# Patient Record
Sex: FEMALE | Race: WHITE | NOT HISPANIC OR LATINO | URBAN - NONMETROPOLITAN AREA
[De-identification: names, ages, dates, MRNs, and addresses within clinical notes are randomized per-mention and may not be internally consistent; named-entity substitution may affect disease eponyms.]

---

## 2021-09-01 ENCOUNTER — HISTORICAL (OUTPATIENT)
Dept: ADMINISTRATIVE | Facility: HOSPITAL | Age: 56
End: 2021-09-01

## 2021-09-16 ENCOUNTER — HISTORICAL (OUTPATIENT)
Dept: ADMINISTRATIVE | Facility: HOSPITAL | Age: 56
End: 2021-09-16

## 2021-10-22 LAB
ALBUMIN SERPL-MCNC: 3.8 G/DL (ref 3.4–5)
ALBUMIN/GLOB SERPL: 1.4 {RATIO}
ALP SERPL-CCNC: 65 U/L (ref 50–144)
ALT SERPL-CCNC: 13 U/L (ref 1–45)
ANION GAP SERPL CALC-SCNC: 6 MMOL/L (ref 7–16)
AST SERPL-CCNC: 22 U/L (ref 14–36)
BASOPHILS # BLD AUTO: 0.02 10*3/UL (ref 0.01–0.08)
BASOPHILS NFR BLD AUTO: 0.5 % (ref 0.1–1.2)
BILIRUB SERPL-MCNC: 0.49 MG/DL (ref 0.1–1)
BUN SERPL-MCNC: 16 MG/DL (ref 7–20)
CALCIUM SERPL-MCNC: 9.3 MG/DL (ref 8.4–10.2)
CHLORIDE SERPL-SCNC: 104 MMOL/L (ref 94–110)
CHOLEST SERPL-MCNC: 274 MG/DL (ref 0–200)
CO2 SERPL-SCNC: 32 MMOL/L (ref 21–32)
CREAT SERPL-MCNC: 0.79 MG/DL (ref 0.52–1.04)
CREAT/UREA NIT SERPL: 20.3 (ref 12–20)
EOSINOPHIL # BLD AUTO: 0.14 10*3/UL (ref 0.04–0.36)
EOSINOPHIL NFR BLD AUTO: 3.2 % (ref 0.7–7)
ERYTHROCYTE [DISTWIDTH] IN BLOOD BY AUTOMATED COUNT: 12.8 % (ref 11–14.5)
EST. AVERAGE GLUCOSE BLD GHB EST-MCNC: 108 MG/DL (ref 70–115)
GLOBULIN SER-MCNC: 2.7 G/DL (ref 2–3.9)
GLUCOSE SERPL-MCNC: 95 MGM./DL (ref 70–115)
HBA1C MFR BLD: 5.6 % (ref 4–6)
HCT VFR BLD AUTO: 42 % (ref 36–48)
HDLC SERPL-MCNC: 52 MG/DL (ref 40–60)
HGB BLD-MCNC: 13.8 G/DL (ref 11.8–16)
IMM GRANULOCYTES # BLD AUTO: 0.01 10*3/UL (ref 0–0.03)
IMM GRANULOCYTES NFR BLD AUTO: 0.2 % (ref 0–0.5)
LDLC SERPL CALC-MCNC: 166.7 MG/DL (ref 30–100)
LYMPHOCYTES # BLD AUTO: 1.81 10*3/UL (ref 1.16–3.74)
LYMPHOCYTES NFR BLD AUTO: 41.3 % (ref 20–55)
MCH RBC QN AUTO: 28.3 PG (ref 27–34)
MCHC RBC AUTO-ENTMCNC: 32.9 G/DL (ref 31–37)
MCV RBC AUTO: 86.1 FL (ref 79–99)
MONOCYTES # BLD AUTO: 0.44 10*3/UL (ref 0.24–0.36)
MONOCYTES NFR BLD AUTO: 10 % (ref 4.7–12.5)
NEUTROPHILS # BLD AUTO: 1.96 10*3/UL (ref 1.56–6.13)
NEUTROPHILS NFR BLD AUTO: 44.8 % (ref 37–73)
PLATELET # BLD AUTO: 253 10*3/UL (ref 140–371)
PMV BLD AUTO: 10.2 FL (ref 9.4–12.4)
POTASSIUM SERPL-SCNC: 4.1 MMOL/L (ref 3.5–5.1)
PROT SERPL-MCNC: 6.5 G/DL (ref 6.3–8.2)
RBC # BLD AUTO: 4.88 10*6/UL (ref 4–5.1)
SODIUM SERPL-SCNC: 142 MMOL/L (ref 135–145)
TRIGL SERPL-MCNC: 123 MG/DL (ref 30–200)
TSH SERPL-ACNC: 0.33 UIU/ML (ref 0.36–3.74)
WBC # SPEC AUTO: 4.4 10*3/UL (ref 4–11.5)

## 2022-01-06 LAB
INFLUENZA A ANTIGEN, POC: NEGATIVE
INFLUENZA B ANTIGEN, POC: NEGATIVE

## 2022-02-03 LAB
DEPRECATED CALCIDIOL+CALCIFEROL SERPL-MC: 28.1 NG/ML (ref 30–100)
T4 FREE SERPL-MCNC: 1.15 NG/DL (ref 0.78–2.19)
TSH SERPL-ACNC: 1.61 M[IU]/L (ref 0.36–3.74)

## 2022-02-04 LAB — VIT B12 SERPL-MCNC: 415 PG/ML (ref 211–946)

## 2022-04-11 ENCOUNTER — HISTORICAL (OUTPATIENT)
Dept: ADMINISTRATIVE | Facility: HOSPITAL | Age: 57
End: 2022-04-11

## 2022-04-29 VITALS
BODY MASS INDEX: 35.76 KG/M2 | HEIGHT: 64 IN | SYSTOLIC BLOOD PRESSURE: 120 MMHG | WEIGHT: 209.44 LBS | OXYGEN SATURATION: 99 % | DIASTOLIC BLOOD PRESSURE: 70 MMHG

## 2022-04-29 LAB — DEPRECATED CALCIDIOL+CALCIFEROL SERPL-MC: 28.2 NG/ML (ref 30–100)

## 2022-05-09 ENCOUNTER — HISTORICAL (OUTPATIENT)
Dept: ADMINISTRATIVE | Facility: HOSPITAL | Age: 57
End: 2022-05-09

## 2022-09-22 ENCOUNTER — HISTORICAL (OUTPATIENT)
Dept: ADMINISTRATIVE | Facility: HOSPITAL | Age: 57
End: 2022-09-22

## 2022-09-23 LAB — CRC RECOMMENDATION EXT: NORMAL

## 2023-01-26 ENCOUNTER — OFFICE VISIT (OUTPATIENT)
Dept: FAMILY MEDICINE | Facility: CLINIC | Age: 58
End: 2023-01-26
Payer: MEDICAID

## 2023-01-26 VITALS
TEMPERATURE: 98 F | HEIGHT: 64 IN | SYSTOLIC BLOOD PRESSURE: 116 MMHG | WEIGHT: 214.31 LBS | BODY MASS INDEX: 36.59 KG/M2 | DIASTOLIC BLOOD PRESSURE: 82 MMHG | HEART RATE: 70 BPM | OXYGEN SATURATION: 98 %

## 2023-01-26 VITALS
TEMPERATURE: 99 F | BODY MASS INDEX: 36.96 KG/M2 | OXYGEN SATURATION: 96 % | HEART RATE: 74 BPM | WEIGHT: 216.5 LBS | HEIGHT: 64 IN | DIASTOLIC BLOOD PRESSURE: 80 MMHG | SYSTOLIC BLOOD PRESSURE: 122 MMHG

## 2023-01-26 DIAGNOSIS — E66.09 CLASS 2 OBESITY DUE TO EXCESS CALORIES WITH BODY MASS INDEX (BMI) OF 37.0 TO 37.9 IN ADULT, UNSPECIFIED WHETHER SERIOUS COMORBIDITY PRESENT: ICD-10-CM

## 2023-01-26 DIAGNOSIS — F33.1 MODERATE EPISODE OF RECURRENT MAJOR DEPRESSIVE DISORDER: Primary | ICD-10-CM

## 2023-01-26 DIAGNOSIS — F50.81 BINGE EATING DISORDER: ICD-10-CM

## 2023-01-26 DIAGNOSIS — F41.9 ANXIETY: ICD-10-CM

## 2023-01-26 PROBLEM — J30.9 ALLERGIC RHINITIS: Status: ACTIVE | Noted: 2023-01-26

## 2023-01-26 PROBLEM — E55.9 VITAMIN D DEFICIENCY: Status: ACTIVE | Noted: 2023-01-26

## 2023-01-26 PROBLEM — M17.12 OSTEOARTHRITIS OF LEFT KNEE: Status: ACTIVE | Noted: 2023-01-26

## 2023-01-26 PROBLEM — G25.81 RESTLESS LEGS SYNDROME: Status: ACTIVE | Noted: 2023-01-26

## 2023-01-26 PROBLEM — E66.9 OBESITY: Status: ACTIVE | Noted: 2023-01-26

## 2023-01-26 PROBLEM — Z98.84 STATUS POST BARIATRIC SURGERY: Status: ACTIVE | Noted: 2023-01-26

## 2023-01-26 PROBLEM — E88.819 INSULIN RESISTANCE: Status: ACTIVE | Noted: 2023-01-26

## 2023-01-26 PROBLEM — M54.30 SCIATICA: Status: ACTIVE | Noted: 2023-01-26

## 2023-01-26 PROBLEM — F33.9 RECURRENT MAJOR DEPRESSIVE DISORDER: Status: ACTIVE | Noted: 2023-01-26

## 2023-01-26 PROCEDURE — 99213 PR OFFICE/OUTPT VISIT, EST, LEVL III, 20-29 MIN: ICD-10-PCS | Mod: ,,, | Performed by: NURSE PRACTITIONER

## 2023-01-26 PROCEDURE — 99213 OFFICE O/P EST LOW 20 MIN: CPT | Mod: ,,, | Performed by: NURSE PRACTITIONER

## 2023-01-26 RX ORDER — FLUTICASONE PROPIONATE 50 MCG
SPRAY, SUSPENSION (ML) NASAL
COMMUNITY
Start: 2023-01-10 | End: 2023-04-25 | Stop reason: SDUPTHER

## 2023-01-26 RX ORDER — ROPINIROLE 0.25 MG/1
0.25 TABLET, FILM COATED ORAL 3 TIMES DAILY
COMMUNITY
Start: 2022-11-10 | End: 2023-03-30 | Stop reason: SDUPTHER

## 2023-01-26 RX ORDER — ASPIRIN 325 MG
50000 TABLET, DELAYED RELEASE (ENTERIC COATED) ORAL
COMMUNITY
Start: 2023-01-05 | End: 2023-03-30 | Stop reason: SDUPTHER

## 2023-01-26 RX ORDER — LIRAGLUTIDE 6 MG/ML
0.6 INJECTION SUBCUTANEOUS DAILY
COMMUNITY
Start: 2022-12-22 | End: 2023-03-30

## 2023-01-26 RX ORDER — LIRAGLUTIDE 6 MG/ML
3 INJECTION SUBCUTANEOUS DAILY
Qty: 15 ML | Refills: 11 | Status: SHIPPED | OUTPATIENT
Start: 2023-01-26 | End: 2023-02-25

## 2023-01-26 RX ORDER — VENLAFAXINE HYDROCHLORIDE 75 MG/1
75 CAPSULE, EXTENDED RELEASE ORAL
COMMUNITY
Start: 2023-01-19 | End: 2023-01-26 | Stop reason: SDUPTHER

## 2023-01-26 RX ORDER — CETIRIZINE HYDROCHLORIDE 10 MG/1
10 TABLET ORAL DAILY
COMMUNITY
Start: 2022-12-14 | End: 2023-09-05 | Stop reason: SDUPTHER

## 2023-01-26 RX ORDER — MELOXICAM 15 MG/1
TABLET ORAL
COMMUNITY
Start: 2022-11-07 | End: 2023-03-30 | Stop reason: SDUPTHER

## 2023-01-26 RX ORDER — ROSUVASTATIN CALCIUM 10 MG/1
10 TABLET, COATED ORAL NIGHTLY
COMMUNITY
Start: 2023-01-19 | End: 2023-03-30 | Stop reason: SDUPTHER

## 2023-01-26 RX ORDER — VENLAFAXINE HYDROCHLORIDE 75 MG/1
75 CAPSULE, EXTENDED RELEASE ORAL DAILY
Qty: 90 CAPSULE | Refills: 3 | Status: SHIPPED | OUTPATIENT
Start: 2023-01-26 | End: 2023-03-30 | Stop reason: SDUPTHER

## 2023-01-26 RX ORDER — BUPROPION HYDROCHLORIDE 150 MG/1
150 TABLET ORAL DAILY
Qty: 90 TABLET | Refills: 3 | Status: SHIPPED | OUTPATIENT
Start: 2023-01-26 | End: 2023-03-30 | Stop reason: SDUPTHER

## 2023-01-26 NOTE — PROGRESS NOTES
Patient ID: Joseph Atwood is a 57 y.o. female.    Chief Complaint: Depression, Weight Check, and Follow-up                     History of Present Illness:  The patient is 57 y.o. White female for evaluation and management with a chief complaint of Depression, Weight Check, and Follow-up.  At last visit, venlafaxine increase to 75 mg daily and Wellbutrin decreased to 150 mg daily.  She reports feeling very well today.  She is no longer feeling as drowsy or fatigued.  Her cravings have resolved.  She now has more energy and seems to be her normal self.  Sleeping at least 8-10 hours per night and wakes feeling rested.  No SI/HI.    Tolerating Victoza 1.8 mg daily without side effect.    ROS: See HPI    Physical Exam  Vitals reviewed.   Constitutional:       Appearance: Normal appearance. She is obese.   Cardiovascular:      Rate and Rhythm: Normal rate and regular rhythm.      Heart sounds: Normal heart sounds.   Pulmonary:      Effort: Pulmonary effort is normal.      Breath sounds: Normal breath sounds.   Abdominal:      General: Bowel sounds are normal.      Palpations: Abdomen is soft.   Skin:     General: Skin is warm and dry.   Neurological:      General: No focal deficit present.      Mental Status: She is alert and oriented to person, place, and time. Mental status is at baseline.   Psychiatric:         Mood and Affect: Mood normal.         Behavior: Behavior normal.         Thought Content: Thought content normal.         Judgment: Judgment normal.         Vitals:    01/26/23 1425   BP: 116/82   Pulse: 70   Temp: 97.9 °F (36.6 °C)            Assessment/Plan:    1. Moderate episode of recurrent major depressive disorder  Continue venlafaxine 75 mg daily and bupropion 150 mg daily  Educated patient on the risks of serotonin based medications such as serotonin modulators and SSRIs/SNRIs including common side effects of nausea, GI upset, headache dizziness as well as the rare risk for worsening symptoms of  depression including development of suicidal thoughts or ideations, and serotonin syndrome.   Discussed benefits of medication not becoming noticeable until up to 6 weeks from start date.   Exercise daily. Get sunlight daily.  Practice positive phrases and repeat throughout the day, along with yoga and relaxation techniques.  Establish good social support, make changes to reduce stress.  Reports any symptoms of suicidal/homicidal ideations or self harm immediately. If clinic is closed, go to nearest emergency room.       2. Anxiety  Practice deep breathing when anxiety occurs.  Exercise daily. Get sunlight daily.  Avoid caffeine, alcohol and stimulants.  Practice positive phrases and repeat throughout the day, along with yoga and relaxation techniques.  Set healthy boundaries, avoid people and conversations that increase stress.  Educated patient on the risks of serotonin based medications such as serotonin modulators and SSRIs/SNRIs including common side effects of nausea, GI upset, headache dizziness as well as the rare risk for worsening symptoms of depression including development of suicidal thoughts or ideations, and serotonin syndrome.   Discussed benefits of medication not becoming noticeable until up to 6 weeks from start date.   Reports any suicidal or homicidal ideations immediately. If clinic is closed, go to nearest emergency room.      3. Binge eating disorder  No issues at this time  Continue counseling     4. Class 2 obesity due to excess calories with body mass index (BMI) of 37.0 to 37.9 in adult, unspecified whether serious comorbidity present  Continue Victoza but increase by 0.6 mg weekly to a goal of 3 mg daily   Body mass index is 37.04 kg/m².  Goal BMI <30.  Exercise 5 times a week for 30 minutes per day.  Avoid soda, simple sugars, excessive rice, potatoes or bread. Limit fast foods and fried foods.  Choose complex carbs in moderation (example: green vegetables, beans, oatmeal). Eat plenty  of fresh fruits and vegetables with lean meats daily.  Do not skip meals. Eat a balanced portion size.  Avoid fad diets. Consider permanent healthy life style changes.         No orders of the defined types were placed in this encounter.     Medications Ordered This Encounter   Medications    buPROPion (WELLBUTRIN XL) 150 MG TB24 tablet     Sig: Take 1 tablet (150 mg total) by mouth once daily.     Dispense:  90 tablet     Refill:  3    liraglutide 0.6 mg/0.1 mL, 18 mg/3 mL, subq PNIJ (VICTOZA 2-KATHRYN) 0.6 mg/0.1 mL (18 mg/3 mL) PnIj pen     Sig: Inject 3 mg into the skin once daily.     Dispense:  15 mL     Refill:  11    venlafaxine (EFFEXOR-XR) 75 MG 24 hr capsule     Sig: Take 1 capsule (75 mg total) by mouth once daily.     Dispense:  90 capsule     Refill:  3        Follow up in about 2 months (around 3/26/2023) for anxiety, obesity. or sooner as needed.      FLORENCIO Redman

## 2023-02-06 ENCOUNTER — DOCUMENTATION ONLY (OUTPATIENT)
Dept: ADMINISTRATIVE | Facility: HOSPITAL | Age: 58
End: 2023-02-06
Payer: MEDICAID

## 2023-03-30 ENCOUNTER — TELEPHONE (OUTPATIENT)
Dept: FAMILY MEDICINE | Facility: CLINIC | Age: 58
End: 2023-03-30

## 2023-03-30 ENCOUNTER — OFFICE VISIT (OUTPATIENT)
Dept: FAMILY MEDICINE | Facility: CLINIC | Age: 58
End: 2023-03-30
Payer: MEDICAID

## 2023-03-30 VITALS
HEART RATE: 88 BPM | SYSTOLIC BLOOD PRESSURE: 118 MMHG | DIASTOLIC BLOOD PRESSURE: 80 MMHG | TEMPERATURE: 97 F | HEIGHT: 64 IN | WEIGHT: 213 LBS | BODY MASS INDEX: 36.37 KG/M2 | OXYGEN SATURATION: 97 %

## 2023-03-30 DIAGNOSIS — E88.810 METABOLIC SYNDROME: ICD-10-CM

## 2023-03-30 DIAGNOSIS — G25.81 RESTLESS LEGS SYNDROME: ICD-10-CM

## 2023-03-30 DIAGNOSIS — L03.011 PARONYCHIA OF FINGER OF RIGHT HAND: ICD-10-CM

## 2023-03-30 DIAGNOSIS — F50.81 BINGE EATING DISORDER: ICD-10-CM

## 2023-03-30 DIAGNOSIS — E78.5 HYPERLIPIDEMIA, UNSPECIFIED HYPERLIPIDEMIA TYPE: ICD-10-CM

## 2023-03-30 DIAGNOSIS — E88.819 INSULIN RESISTANCE: Primary | ICD-10-CM

## 2023-03-30 DIAGNOSIS — F33.1 MODERATE EPISODE OF RECURRENT MAJOR DEPRESSIVE DISORDER: ICD-10-CM

## 2023-03-30 DIAGNOSIS — M17.12 PRIMARY OSTEOARTHRITIS OF LEFT KNEE: ICD-10-CM

## 2023-03-30 DIAGNOSIS — F41.9 ANXIETY: ICD-10-CM

## 2023-03-30 DIAGNOSIS — E55.9 VITAMIN D DEFICIENCY: ICD-10-CM

## 2023-03-30 PROCEDURE — 3074F PR MOST RECENT SYSTOLIC BLOOD PRESSURE < 130 MM HG: ICD-10-PCS | Mod: CPTII,,, | Performed by: NURSE PRACTITIONER

## 2023-03-30 PROCEDURE — 1159F MED LIST DOCD IN RCRD: CPT | Mod: CPTII,,, | Performed by: NURSE PRACTITIONER

## 2023-03-30 PROCEDURE — 3008F BODY MASS INDEX DOCD: CPT | Mod: CPTII,,, | Performed by: NURSE PRACTITIONER

## 2023-03-30 PROCEDURE — 3079F DIAST BP 80-89 MM HG: CPT | Mod: CPTII,,, | Performed by: NURSE PRACTITIONER

## 2023-03-30 PROCEDURE — 1160F PR REVIEW ALL MEDS BY PRESCRIBER/CLIN PHARMACIST DOCUMENTED: ICD-10-PCS | Mod: CPTII,,, | Performed by: NURSE PRACTITIONER

## 2023-03-30 PROCEDURE — 1159F PR MEDICATION LIST DOCUMENTED IN MEDICAL RECORD: ICD-10-PCS | Mod: CPTII,,, | Performed by: NURSE PRACTITIONER

## 2023-03-30 PROCEDURE — 3074F SYST BP LT 130 MM HG: CPT | Mod: CPTII,,, | Performed by: NURSE PRACTITIONER

## 2023-03-30 PROCEDURE — 3008F PR BODY MASS INDEX (BMI) DOCUMENTED: ICD-10-PCS | Mod: CPTII,,, | Performed by: NURSE PRACTITIONER

## 2023-03-30 PROCEDURE — 99214 PR OFFICE/OUTPT VISIT, EST, LEVL IV, 30-39 MIN: ICD-10-PCS | Mod: ,,, | Performed by: NURSE PRACTITIONER

## 2023-03-30 PROCEDURE — 99214 OFFICE O/P EST MOD 30 MIN: CPT | Mod: ,,, | Performed by: NURSE PRACTITIONER

## 2023-03-30 PROCEDURE — 3079F PR MOST RECENT DIASTOLIC BLOOD PRESSURE 80-89 MM HG: ICD-10-PCS | Mod: CPTII,,, | Performed by: NURSE PRACTITIONER

## 2023-03-30 PROCEDURE — 1160F RVW MEDS BY RX/DR IN RCRD: CPT | Mod: CPTII,,, | Performed by: NURSE PRACTITIONER

## 2023-03-30 RX ORDER — ASPIRIN 325 MG
50000 TABLET, DELAYED RELEASE (ENTERIC COATED) ORAL
Qty: 12 CAPSULE | Refills: 3 | Status: SHIPPED | OUTPATIENT
Start: 2023-03-30 | End: 2023-06-28

## 2023-03-30 RX ORDER — BUPROPION HYDROCHLORIDE 150 MG/1
150 TABLET ORAL DAILY
Qty: 90 TABLET | Refills: 3 | Status: SHIPPED | OUTPATIENT
Start: 2023-03-30 | End: 2023-10-10 | Stop reason: SDUPTHER

## 2023-03-30 RX ORDER — SEMAGLUTIDE 1.34 MG/ML
0.5 INJECTION, SOLUTION SUBCUTANEOUS
Qty: 2 EACH | Refills: 0 | Status: SHIPPED | OUTPATIENT
Start: 2023-03-30 | End: 2023-04-25 | Stop reason: DRUGHIGH

## 2023-03-30 RX ORDER — MELOXICAM 15 MG/1
15 TABLET ORAL DAILY PRN
Qty: 90 TABLET | Refills: 3 | Status: SHIPPED | OUTPATIENT
Start: 2023-03-30 | End: 2023-06-28

## 2023-03-30 RX ORDER — ROSUVASTATIN CALCIUM 10 MG/1
10 TABLET, COATED ORAL NIGHTLY
Qty: 90 TABLET | Refills: 3 | Status: SHIPPED | OUTPATIENT
Start: 2023-03-30 | End: 2023-08-24

## 2023-03-30 RX ORDER — ROPINIROLE 0.25 MG/1
0.25 TABLET, FILM COATED ORAL NIGHTLY
Qty: 90 TABLET | Refills: 3 | Status: SHIPPED | OUTPATIENT
Start: 2023-03-30 | End: 2024-09-05

## 2023-03-30 RX ORDER — VENLAFAXINE HYDROCHLORIDE 75 MG/1
75 CAPSULE, EXTENDED RELEASE ORAL DAILY
Qty: 90 CAPSULE | Refills: 3 | Status: SHIPPED | OUTPATIENT
Start: 2023-03-30 | End: 2023-10-10 | Stop reason: SDUPTHER

## 2023-03-30 NOTE — PROGRESS NOTES
Patient ID: Joseph Atwood is a 57 y.o. female.    Chief Complaint: Obesity and Follow-up                     History of Present Illness:  The patient is 57 y.o. White female for evaluation and management with a chief complaint of Obesity and Follow-up .  Takes Victoza daily but continues to struggle with her diet.  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 She is also in the process of moving to [place].  No longer binge eating and finds anxiety and depression to be much improved.  Resting well. She only requires as-needed dose of Requip for restless leg.    She complains of pain and redness near the fingernail of the 3rd digit of her right hand.  Has been present for about 1 week.  She continues to have trauma to the area while sorting through folders in various documents.  No drainage from the area.    ROS: See HPI    Past Medical History:  has a past medical history of Allergy, Anxiety, Binge eating disorder, Hair loss, Insulin resistance, Major depression, recurrent, chronic, Obesity, unspecified, Osteoarthritis of left knee, RLS (restless legs syndrome), Sciatica of left side without back pain, and Vitamin D deficiency.    Current Outpatient Medications   Medication Instructions    buPROPion (WELLBUTRIN XL) 150 mg, Oral, Daily    cetirizine (ZYRTEC) 10 mg, Oral, Daily    cholecalciferol (vitamin D3) 50,000 Units, Oral, Every 7 days    fluticasone propionate (FLONASE) 50 mcg/actuation nasal spray   See Instructions, USE 2 SPRAYS IN EACH NOSTRIL DAILY, # 16 mL, 0 Refill(s), Pharmacy: [id number], 162, cm, Height/Length Dosing, 12/13/22 14:34:00 CST, 98.2, kg, Weight Dosing, 12/13/22 14:34:00 CST    meloxicam (MOBIC) 15 mg, Oral, Daily PRN    OZEMPIC 0.5 mg, Subcutaneous, Every 7 days    rOPINIRole (REQUIP) 0.25 mg, Oral, Nightly    rosuvastatin (CRESTOR) 10 mg, Oral, Nightly    venlafaxine (EFFEXOR-XR) 75 mg, Oral, Daily       Patient is allergic to hydrocodone-acetaminophen and  "penicillin.       Visit Vitals  /80   Pulse 88   Temp 97.2 °F (36.2 °C) (Temporal)   Ht 5' 3.78" (1.62 m)   Wt 96.6 kg (213 lb)   SpO2 97%   BMI 36.81 kg/m²         Physical Exam  Vitals reviewed.   Constitutional:       Appearance: Normal appearance. She is obese.   Cardiovascular:      Rate and Rhythm: Normal rate and regular rhythm.      Heart sounds: Normal heart sounds.   Pulmonary:      Effort: Pulmonary effort is normal.      Breath sounds: Normal breath sounds.   Abdominal:      General: Bowel sounds are normal.      Palpations: Abdomen is soft.   Skin:     General: Skin is warm and dry.      Comments: Area of erythema/edema surrounding nail 3rd digit of right hand   Neurological:      General: No focal deficit present.      Mental Status: She is alert. Mental status is at baseline.   Psychiatric:         Mood and Affect: Mood normal.         Behavior: Behavior normal.         Thought Content: Thought content normal.         Judgment: Judgment normal.         Assessment/Plan:    ICD-10-CM ICD-9-CM   1. Insulin resistance  E88.81 277.7   2. Metabolic syndrome  E88.81 277.7   3. Binge eating disorder  F50.81 307.50   4. Moderate episode of recurrent major depressive disorder  F33.1 296.32   5. Anxiety  F41.9 300.00   6. Restless legs syndrome  G25.81 333.94   7. Hyperlipidemia, unspecified hyperlipidemia type  E78.5 272.4   8. Primary osteoarthritis of left knee  M17.12 715.16       1. Metabolic syndrome  - semaglutide (OZEMPIC) 0.25 mg or 0.5 mg(2 mg/1.5 mL) pen injector; Inject 0.5 mg into the skin every 7 days.  Dispense: 2 each; Refill: 0    2. Insulin resistance  - semaglutide (OZEMPIC) 0.25 mg or 0.5 mg(2 mg/1.5 mL) pen injector; Inject 0.5 mg into the skin every 7 days.  Dispense: 2 each; Refill: 0    3. Binge eating disorder      4. Moderate episode of recurrent major depressive disorder  - buPROPion (WELLBUTRIN XL) 150 MG TB24 tablet; Take 1 tablet (150 mg total) by mouth once daily.  Dispense: " 90 tablet; Refill: 3  - venlafaxine (EFFEXOR-XR) 75 MG 24 hr capsule; Take 1 capsule (75 mg total) by mouth once daily.  Dispense: 90 capsule; Refill: 3    5. Anxiety  - buPROPion (WELLBUTRIN XL) 150 MG TB24 tablet; Take 1 tablet (150 mg total) by mouth once daily.  Dispense: 90 tablet; Refill: 3  - venlafaxine (EFFEXOR-XR) 75 MG 24 hr capsule; Take 1 capsule (75 mg total) by mouth once daily.  Dispense: 90 capsule; Refill: 3    6. Restless legs syndrome  - rOPINIRole (REQUIP) 0.25 MG tablet; Take 1 tablet (0.25 mg total) by mouth every evening.  Dispense: 90 tablet; Refill: 3    7. Hyperlipidemia, unspecified hyperlipidemia type  - rosuvastatin (CRESTOR) 10 MG tablet; Take 1 tablet (10 mg total) by mouth every evening.  Dispense: 90 tablet; Refill: 3    8. Primary osteoarthritis of left knee  - meloxicam (MOBIC) 15 MG tablet; Take 1 tablet (15 mg total) by mouth daily as needed for Pain.  Dispense: 90 tablet; Refill: 3     Stop Victoza. Initiate Ozempic 0.5 mg weekly and plan to increase to 1 mg weekly if tolerating well over the next few weeks   Continue all other current medications as prescribed, refills given  Counseled on diet and exercise  FLP and CMP; will call with results   Warm Epson salt soaks, cleansed thoroughly with soap and water, apply Neosporin to 3rd digit of right hand.  If area of infection worsens, treat with Bactrim DS for 1 week       Follow up in about 3 months (around 6/30/2023) for With Fasting Labs Prior. or sooner as needed.    Future Appointments   Date Time Provider Department Center   6/20/2023  1:00 PM FLORENCIO Redman FNP

## 2023-04-03 PROCEDURE — 80061 LIPID PANEL: CPT | Performed by: NURSE PRACTITIONER

## 2023-04-03 PROCEDURE — 80053 COMPREHEN METABOLIC PANEL: CPT | Performed by: NURSE PRACTITIONER

## 2023-04-06 ENCOUNTER — TELEPHONE (OUTPATIENT)
Dept: FAMILY MEDICINE | Facility: CLINIC | Age: 58
End: 2023-04-06
Payer: MEDICAID

## 2023-04-06 NOTE — TELEPHONE ENCOUNTER
----- Message from Lakisha Flores MA sent at 4/6/2023 10:04 AM CDT -----  Regarding: Call back/Stliudmila  Needs antibiotic eye drops called in for a stye under her eye lid. Said she called yesterday and left a message. Please call her back.    [facility] in [place]  [phone]

## 2023-04-06 NOTE — TELEPHONE ENCOUNTER
I spoke to pt, advised her to apply warm compresses and keep her hands and fingernails clean, buy Stye away, let it run it's course. Pt verbalized understanding

## 2023-04-25 ENCOUNTER — TELEPHONE (OUTPATIENT)
Dept: FAMILY MEDICINE | Facility: CLINIC | Age: 58
End: 2023-04-25
Payer: MEDICAID

## 2023-04-25 DIAGNOSIS — J30.9 ALLERGIC RHINITIS, UNSPECIFIED SEASONALITY, UNSPECIFIED TRIGGER: Primary | ICD-10-CM

## 2023-04-25 DIAGNOSIS — E88.819 INSULIN RESISTANCE: ICD-10-CM

## 2023-04-25 DIAGNOSIS — E88.810 METABOLIC SYNDROME: ICD-10-CM

## 2023-04-25 RX ORDER — SEMAGLUTIDE 1.34 MG/ML
1 INJECTION, SOLUTION SUBCUTANEOUS
Qty: 3 ML | Refills: 11 | Status: SHIPPED | OUTPATIENT
Start: 2023-04-25 | End: 2023-09-05

## 2023-04-25 RX ORDER — FLUTICASONE PROPIONATE 50 MCG
SPRAY, SUSPENSION (ML) NASAL
Qty: 16 G | Refills: 11 | Status: SHIPPED | OUTPATIENT
Start: 2023-04-25

## 2023-04-25 NOTE — TELEPHONE ENCOUNTER
----- Message from Patricia Reddy sent at 4/25/2023  1:29 PM CDT -----  Regarding: Med question  Patient had her prescriptions refilled at her last appt. And then she had them transferred from [facility] to [facility]. Her Nasal [place] was not sent to [facility], and [facility] needs to know if there is any refills on the nasal spray, and if it could be sent to [facility].  They are unable to get in touch with [facility] to question them about it..      She is also wanting to talk to Ricarda about the injections - to check on the dosage.       Joseph Garcia   [phone]

## 2023-04-25 NOTE — TELEPHONE ENCOUNTER
Pt states the Ozempic 0.5 mg is working well for her and feels it can be increased at this time, per lov note pt can increase to 1 mg weekly. Ozempic and  Fluticasone nasal spray were sent to [facility].

## 2023-08-17 ENCOUNTER — TELEPHONE (OUTPATIENT)
Dept: FAMILY MEDICINE | Facility: CLINIC | Age: 58
End: 2023-08-17
Payer: MEDICAID

## 2023-08-17 NOTE — TELEPHONE ENCOUNTER
Pt weighted in at 190 today wanted you to know. She wants to take ozempic but insurance won't pay so she said she will go back to taking Victoza. She has an appt. With you 9-5-23 @ 2:30

## 2023-08-21 NOTE — TELEPHONE ENCOUNTER
Please let her know that we have experienced availability issues with Victoza at the hospital.  We can revisit this topic at her upcoming appointment but for now I am only sending the medication for diabetics.

## 2023-08-24 DIAGNOSIS — E78.5 HYPERLIPIDEMIA, UNSPECIFIED HYPERLIPIDEMIA TYPE: ICD-10-CM

## 2023-08-24 RX ORDER — ROSUVASTATIN CALCIUM 10 MG/1
10 TABLET, COATED ORAL NIGHTLY
Qty: 90 TABLET | Refills: 3 | Status: SHIPPED | OUTPATIENT
Start: 2023-08-24

## 2023-09-05 ENCOUNTER — OFFICE VISIT (OUTPATIENT)
Dept: FAMILY MEDICINE | Facility: CLINIC | Age: 58
End: 2023-09-05
Payer: MEDICAID

## 2023-09-05 VITALS
DIASTOLIC BLOOD PRESSURE: 70 MMHG | TEMPERATURE: 99 F | OXYGEN SATURATION: 97 % | HEART RATE: 72 BPM | SYSTOLIC BLOOD PRESSURE: 110 MMHG | WEIGHT: 197.38 LBS | BODY MASS INDEX: 34.97 KG/M2 | HEIGHT: 63 IN

## 2023-09-05 DIAGNOSIS — L30.4 INTERTRIGO: ICD-10-CM

## 2023-09-05 DIAGNOSIS — E55.9 VITAMIN D DEFICIENCY: ICD-10-CM

## 2023-09-05 DIAGNOSIS — E78.5 HYPERLIPIDEMIA, UNSPECIFIED HYPERLIPIDEMIA TYPE: ICD-10-CM

## 2023-09-05 DIAGNOSIS — F41.9 ANXIETY: ICD-10-CM

## 2023-09-05 DIAGNOSIS — J30.9 ALLERGIC RHINITIS, UNSPECIFIED SEASONALITY, UNSPECIFIED TRIGGER: Primary | ICD-10-CM

## 2023-09-05 DIAGNOSIS — G62.9 NEUROPATHY: ICD-10-CM

## 2023-09-05 DIAGNOSIS — F33.1 MODERATE EPISODE OF RECURRENT MAJOR DEPRESSIVE DISORDER: ICD-10-CM

## 2023-09-05 DIAGNOSIS — Z12.4 CERVICAL CANCER SCREENING: ICD-10-CM

## 2023-09-05 DIAGNOSIS — Z98.84 STATUS POST BARIATRIC SURGERY: ICD-10-CM

## 2023-09-05 DIAGNOSIS — Z12.31 SCREENING MAMMOGRAM FOR BREAST CANCER: ICD-10-CM

## 2023-09-05 DIAGNOSIS — G25.81 RESTLESS LEGS SYNDROME: ICD-10-CM

## 2023-09-05 PROCEDURE — 3074F SYST BP LT 130 MM HG: CPT | Mod: CPTII,,, | Performed by: NURSE PRACTITIONER

## 2023-09-05 PROCEDURE — 1160F PR REVIEW ALL MEDS BY PRESCRIBER/CLIN PHARMACIST DOCUMENTED: ICD-10-PCS | Mod: CPTII,,, | Performed by: NURSE PRACTITIONER

## 2023-09-05 PROCEDURE — 3074F PR MOST RECENT SYSTOLIC BLOOD PRESSURE < 130 MM HG: ICD-10-PCS | Mod: CPTII,,, | Performed by: NURSE PRACTITIONER

## 2023-09-05 PROCEDURE — 1159F PR MEDICATION LIST DOCUMENTED IN MEDICAL RECORD: ICD-10-PCS | Mod: CPTII,,, | Performed by: NURSE PRACTITIONER

## 2023-09-05 PROCEDURE — 99214 OFFICE O/P EST MOD 30 MIN: CPT | Mod: ,,, | Performed by: NURSE PRACTITIONER

## 2023-09-05 PROCEDURE — 3008F BODY MASS INDEX DOCD: CPT | Mod: CPTII,,, | Performed by: NURSE PRACTITIONER

## 2023-09-05 PROCEDURE — 3078F DIAST BP <80 MM HG: CPT | Mod: CPTII,,, | Performed by: NURSE PRACTITIONER

## 2023-09-05 PROCEDURE — 3008F PR BODY MASS INDEX (BMI) DOCUMENTED: ICD-10-PCS | Mod: CPTII,,, | Performed by: NURSE PRACTITIONER

## 2023-09-05 PROCEDURE — 3078F PR MOST RECENT DIASTOLIC BLOOD PRESSURE < 80 MM HG: ICD-10-PCS | Mod: CPTII,,, | Performed by: NURSE PRACTITIONER

## 2023-09-05 PROCEDURE — 1159F MED LIST DOCD IN RCRD: CPT | Mod: CPTII,,, | Performed by: NURSE PRACTITIONER

## 2023-09-05 PROCEDURE — 1160F RVW MEDS BY RX/DR IN RCRD: CPT | Mod: CPTII,,, | Performed by: NURSE PRACTITIONER

## 2023-09-05 PROCEDURE — 99214 PR OFFICE/OUTPT VISIT, EST, LEVL IV, 30-39 MIN: ICD-10-PCS | Mod: ,,, | Performed by: NURSE PRACTITIONER

## 2023-09-05 RX ORDER — KETOCONAZOLE 20 MG/G
CREAM TOPICAL DAILY
Qty: 60 G | Refills: 0 | Status: SHIPPED | OUTPATIENT
Start: 2023-09-05 | End: 2023-10-10

## 2023-09-05 RX ORDER — CETIRIZINE HYDROCHLORIDE 10 MG/1
10 TABLET ORAL DAILY
Qty: 90 TABLET | Refills: 3 | Status: SHIPPED | OUTPATIENT
Start: 2023-09-05

## 2023-09-05 RX ORDER — ASPIRIN 325 MG
50000 TABLET, DELAYED RELEASE (ENTERIC COATED) ORAL
COMMUNITY
Start: 2023-08-25

## 2023-09-05 RX ORDER — MELOXICAM 15 MG/1
15 TABLET ORAL
COMMUNITY
Start: 2023-08-24 | End: 2023-10-10

## 2023-09-05 NOTE — PROGRESS NOTES
Patient ID: Joseph Atwood  : 1965    Chief Complaint: Follow-up    Allergies: Patient is allergic to hydrocodone-acetaminophen and penicillin.     History of Present Illness:  The patient is a 57 y.o. White female who presents to clinic for follow up on Follow-up.  She is living in the [facility] so she has not had labs done.  Continues on medications but needs a refill on cetirizine.  She complains of burning and sharp pains to bilateral legs.  Very sensitive to the touch.  Pain has been present for several months.      She complains of watery eyes and congestion to the left .  Onset of symptoms about 1 week ago.  No fever or chills.  No cough.    She complains of vaginal dryness and pain with intercourse.  Missed initial appointment with gyn due to COVID-19 and would like for this to be rescheduled.    She also complains of a rash beneath both breasts and abdominal folds.  Usually present during hot months but has not resolved with powder as it has done in the past.    She has lost an additional 16 lb over the past 6 months despite being off of Ozempic.    Social History:  reports that she has never smoked. She has never used smokeless tobacco. She reports that she does not drink alcohol and does not use drugs.    Past Medical History:  has a past medical history of Allergy, Anxiety, Binge eating disorder, Hair loss, Insulin resistance, Major depression, recurrent, chronic, Obesity, unspecified, Osteoarthritis of left knee, RLS (restless legs syndrome), Sciatica of left side without back pain, and Vitamin D deficiency.    Current Medications:  Current Outpatient Medications   Medication Instructions    buPROPion (WELLBUTRIN XL) 150 mg, Oral, Daily    cetirizine (ZYRTEC) 10 mg, Oral, Daily    cholecalciferol (vitamin D3) 50,000 Units, Oral, Every 7 days    fluticasone propionate (FLONASE) 50 mcg/actuation nasal spray See Instructions, USE 2 SPRAYS IN EACH NOSTRIL DAILY, # 16 mL, 0 Refill(s), Pharmacy: [facility]  "STORE [id number], 162, cm, Height/Length Dosing, 12/13/22 14:34:00 CST, 98.2, kg, Weight Dosing, 12/13/22 14:34:00 CST    meloxicam (MOBIC) 15 mg, Oral, As needed (PRN)    rOPINIRole (REQUIP) 0.25 mg, Oral, Nightly    rosuvastatin (CRESTOR) 10 mg, Oral, Nightly    venlafaxine (EFFEXOR-XR) 75 mg, Oral, Daily       ROS: See HPI    Visit Vitals  /70   Pulse 72   Temp 98.7 °F (37.1 °C)   Ht 5' 3" (1.6 m)   Wt 89.5 kg (197 lb 6.4 oz)   SpO2 97%   BMI 34.97 kg/m²       Physical Exam  Vitals reviewed.   Constitutional:       Appearance: Normal appearance. She is obese.   Cardiovascular:      Rate and Rhythm: Normal rate and regular rhythm.      Heart sounds: Normal heart sounds.   Pulmonary:      Effort: Pulmonary effort is normal. No respiratory distress.      Breath sounds: Normal breath sounds.   Abdominal:      General: Bowel sounds are normal.      Palpations: Abdomen is soft.      Tenderness: There is no abdominal tenderness.   Musculoskeletal:      Cervical back: Normal range of motion and neck supple. No tenderness.      Right lower leg: No edema.      Left lower leg: No edema.   Lymphadenopathy:      Cervical: No cervical adenopathy.   Skin:     General: Skin is warm and dry.   Neurological:      Mental Status: She is alert and oriented to person, place, and time. Mental status is at baseline.   Psychiatric:         Mood and Affect: Mood normal.         Thought Content: Thought content normal.         Judgment: Judgment normal.            Assessment & Plan:  1. Allergic rhinitis, unspecified seasonality, unspecified trigger  Assessment & Plan:  Refill cetirizine and restart fluticasone daily    Orders:  -     cetirizine (ZYRTEC) 10 MG tablet; Take 1 tablet (10 mg total) by mouth once daily.  Dispense: 90 tablet; Refill: 3    2. Moderate episode of recurrent major depressive disorder  Overview:  On Wellbutrin  mg daily and venlafaxine 75 mg daily      3. Anxiety  -     CBC Auto Differential; Future; Expected " date: 10/03/2023  -     Comprehensive Metabolic Panel; Future; Expected date: 10/03/2023  -     TSH; Future; Expected date: 10/03/2023    4. Restless legs syndrome  Overview:  On ropinirole 0.25 mg nightly      5. Status post bariatric surgery  Overview:  April 2018      6. Vitamin D deficiency  Overview:  On vitamin D3 21232 units weekly    Orders:  -     Vitamin D; Future; Expected date: 10/03/2023    7. Neuropathy  -     Vitamin B12; Future; Expected date: 10/03/2023    8. Hyperlipidemia, unspecified hyperlipidemia type  Overview:  On rosuvastatin 10 mg nightly    Orders:  -     Lipid Panel; Future; Expected date: 10/03/2023  -     Hemoglobin A1C; Future; Expected date: 10/03/2023    9. Cervical cancer screening  -     Ambulatory referral/consult to Obstetrics / Gynecology; Future; Expected date: 09/12/2023         Follow up in about 2 months (around 11/5/2023) for Wellness, With Fasting Labs Prior. Call sooner if needed.    FLORENCIO Redman    Lab Frequency Next Occurrence   Comprehensive Metabolic Panel Once 06/30/2023   Lipid Panel Once 06/30/2023   Vitamin D Once 06/30/2023   Ambulatory referral/consult to Obstetrics / Gynecology Once 09/12/2023   CBC Auto Differential Once 10/03/2023   Comprehensive Metabolic Panel Once 10/03/2023   Lipid Panel Once 10/03/2023   Hemoglobin A1C Once 10/03/2023   TSH Once 10/03/2023   Vitamin D Once 10/03/2023   Vitamin B12 Once 10/03/2023

## 2023-10-10 ENCOUNTER — TELEPHONE (OUTPATIENT)
Dept: FAMILY MEDICINE | Facility: CLINIC | Age: 58
End: 2023-10-10

## 2023-10-10 ENCOUNTER — OFFICE VISIT (OUTPATIENT)
Dept: FAMILY MEDICINE | Facility: CLINIC | Age: 58
End: 2023-10-10
Payer: MEDICAID

## 2023-10-10 VITALS
SYSTOLIC BLOOD PRESSURE: 110 MMHG | HEIGHT: 63 IN | TEMPERATURE: 99 F | WEIGHT: 188 LBS | HEART RATE: 78 BPM | DIASTOLIC BLOOD PRESSURE: 80 MMHG | BODY MASS INDEX: 33.31 KG/M2 | OXYGEN SATURATION: 98 %

## 2023-10-10 DIAGNOSIS — F33.1 MODERATE EPISODE OF RECURRENT MAJOR DEPRESSIVE DISORDER: ICD-10-CM

## 2023-10-10 DIAGNOSIS — F41.9 ANXIETY: ICD-10-CM

## 2023-10-10 DIAGNOSIS — J01.01 ACUTE RECURRENT MAXILLARY SINUSITIS: ICD-10-CM

## 2023-10-10 PROCEDURE — 99214 PR OFFICE/OUTPT VISIT, EST, LEVL IV, 30-39 MIN: ICD-10-PCS | Mod: ,,, | Performed by: NURSE PRACTITIONER

## 2023-10-10 PROCEDURE — 3079F DIAST BP 80-89 MM HG: CPT | Mod: CPTII,,, | Performed by: NURSE PRACTITIONER

## 2023-10-10 PROCEDURE — 1159F PR MEDICATION LIST DOCUMENTED IN MEDICAL RECORD: ICD-10-PCS | Mod: CPTII,,, | Performed by: NURSE PRACTITIONER

## 2023-10-10 PROCEDURE — 3074F SYST BP LT 130 MM HG: CPT | Mod: CPTII,,, | Performed by: NURSE PRACTITIONER

## 2023-10-10 PROCEDURE — 99214 OFFICE O/P EST MOD 30 MIN: CPT | Mod: ,,, | Performed by: NURSE PRACTITIONER

## 2023-10-10 PROCEDURE — 1160F PR REVIEW ALL MEDS BY PRESCRIBER/CLIN PHARMACIST DOCUMENTED: ICD-10-PCS | Mod: CPTII,,, | Performed by: NURSE PRACTITIONER

## 2023-10-10 PROCEDURE — 3008F BODY MASS INDEX DOCD: CPT | Mod: CPTII,,, | Performed by: NURSE PRACTITIONER

## 2023-10-10 PROCEDURE — 3008F PR BODY MASS INDEX (BMI) DOCUMENTED: ICD-10-PCS | Mod: CPTII,,, | Performed by: NURSE PRACTITIONER

## 2023-10-10 PROCEDURE — 3079F PR MOST RECENT DIASTOLIC BLOOD PRESSURE 80-89 MM HG: ICD-10-PCS | Mod: CPTII,,, | Performed by: NURSE PRACTITIONER

## 2023-10-10 PROCEDURE — 3074F PR MOST RECENT SYSTOLIC BLOOD PRESSURE < 130 MM HG: ICD-10-PCS | Mod: CPTII,,, | Performed by: NURSE PRACTITIONER

## 2023-10-10 PROCEDURE — 1160F RVW MEDS BY RX/DR IN RCRD: CPT | Mod: CPTII,,, | Performed by: NURSE PRACTITIONER

## 2023-10-10 PROCEDURE — 1159F MED LIST DOCD IN RCRD: CPT | Mod: CPTII,,, | Performed by: NURSE PRACTITIONER

## 2023-10-10 RX ORDER — VENLAFAXINE HYDROCHLORIDE 75 MG/1
75 CAPSULE, EXTENDED RELEASE ORAL DAILY
Qty: 90 CAPSULE | Refills: 3 | Status: SHIPPED | OUTPATIENT
Start: 2023-10-10

## 2023-10-10 RX ORDER — VENLAFAXINE HYDROCHLORIDE 75 MG/1
75 CAPSULE, EXTENDED RELEASE ORAL DAILY
Qty: 90 CAPSULE | Refills: 0 | Status: SHIPPED | OUTPATIENT
Start: 2023-10-10 | End: 2023-10-10 | Stop reason: SDUPTHER

## 2023-10-10 RX ORDER — BUPROPION HYDROCHLORIDE 150 MG/1
150 TABLET ORAL DAILY
Qty: 90 TABLET | Refills: 3 | Status: SHIPPED | OUTPATIENT
Start: 2023-10-10

## 2023-10-10 RX ORDER — MUPIROCIN 20 MG/G
OINTMENT TOPICAL 3 TIMES DAILY
Qty: 22 G | Refills: 0 | Status: SHIPPED | OUTPATIENT
Start: 2023-10-10

## 2023-10-10 RX ORDER — DOXYCYCLINE 100 MG/1
100 CAPSULE ORAL EVERY 12 HOURS
Qty: 20 CAPSULE | Refills: 0 | Status: SHIPPED | OUTPATIENT
Start: 2023-10-10 | End: 2023-10-20

## 2023-10-10 RX ORDER — BUPROPION HYDROCHLORIDE 150 MG/1
150 TABLET ORAL DAILY
Qty: 90 TABLET | Refills: 0 | Status: SHIPPED | OUTPATIENT
Start: 2023-10-10 | End: 2023-10-10 | Stop reason: SDUPTHER

## 2023-10-10 NOTE — PROGRESS NOTES
Patient ID: Joseph Atwood  : 1965     Chief Complaint: Eye Pain and Conjunctivitis    Allergies: Patient is allergic to hydrocodone-acetaminophen and penicillin.     History of Present Illness:  The patient is a 57 y.o. White female who presents to clinic for evaluation and management with a chief complaint of Eye Pain and Conjunctivitis   Patient reports she had a recurrent pimple left nostril with swelling and redness. Removed a hair from nostril and feels swelling is a little better. Also had some sinus congestion for past week with worsening pressure on left side nose. Denies any fever, sob, chest pains.     Patient reports mood is stable on wellbutrin and venlafaxine. Is moving to [place]  to help daughter who is pregnant. Requesting a 90 day refill of medicine so she has time to establish care at new home.     Eye Pain   The left eye is affected. This is a new problem. The current episode started in the past 7 days. There was no injury mechanism. There is No known exposure to pink eye. She Does not wear contacts. Associated symptoms include eye redness and a recent URI. Pertinent negatives include no blurred vision, eye discharge, double vision, foreign body sensation, itching or photophobia.        Past Medical History:  has a past medical history of Allergy, Anxiety, Binge eating disorder, Hair loss, Insulin resistance, Major depression, recurrent, chronic, Obesity, unspecified, Osteoarthritis of left knee, RLS (restless legs syndrome), Sciatica of left side without back pain, and Vitamin D deficiency.    Social History:  reports that she has never smoked. She has never used smokeless tobacco. She reports that she does not drink alcohol and does not use drugs.    Care Team: Patient Care Team:  Sylvia Perkins FNP as PCP - General (Family Medicine)     Current Medications:  Current Outpatient Medications   Medication Instructions    buPROPion (WELLBUTRIN XL) 150 mg, Oral, Daily    cetirizine  "(ZYRTEC) 10 mg, Oral, Daily    cholecalciferol (vitamin D3) 50,000 Units, Oral, Every 7 days    doxycycline (VIBRAMYCIN) 100 mg, Oral, Every 12 hours    fluticasone propionate (FLONASE) 50 mcg/actuation nasal spray See Instructions, USE 2 SPRAYS IN EACH NOSTRIL DAILY, # 16 mL, 0 Refill(s), Pharmacy: [id number], 162, cm, Height/Length Dosing, 12/13/22 14:34:00 CST, 98.2, kg, Weight Dosing, 12/13/22 14:34:00 CST    mupirocin (BACTROBAN) 2 % ointment Topical (Top), 3 times daily    rOPINIRole (REQUIP) 0.25 mg, Oral, Nightly    rosuvastatin (CRESTOR) 10 mg, Oral, Nightly    venlafaxine (EFFEXOR-XR) 75 mg, Oral, Daily       Review of Systems   Eyes:  Positive for pain and redness. Negative for blurred vision, double vision, photophobia, discharge and itching.        Visit Vitals  /80 (BP Location: Right arm)   Pulse 78   Temp 98.6 °F (37 °C) (Temporal)   Ht 5' 3" (1.6 m)   Wt 85.3 kg (188 lb)   SpO2 98%   BMI 33.30 kg/m²       Physical Exam  Vitals reviewed.   Constitutional:       Appearance: Normal appearance. She is obese.   HENT:      Right Ear: A middle ear effusion is present.      Left Ear: A middle ear effusion is present.      Nose: Rhinorrhea present. Rhinorrhea is clear.      Left Turbinates: Enlarged and swollen.      Left Sinus: Maxillary sinus tenderness present.   Cardiovascular:      Rate and Rhythm: Normal rate and regular rhythm.      Heart sounds: Normal heart sounds.   Pulmonary:      Effort: Pulmonary effort is normal. No respiratory distress.      Breath sounds: Normal breath sounds.   Abdominal:      General: Bowel sounds are normal.      Palpations: Abdomen is soft.      Tenderness: There is no abdominal tenderness.   Musculoskeletal:      Cervical back: Normal range of motion and neck supple. No tenderness.      Right lower leg: No edema.      Left lower leg: No edema.   Lymphadenopathy:      Cervical: No cervical adenopathy.   Skin:     General: Skin is warm and dry.   Neurological: "      Mental Status: She is alert and oriented to person, place, and time. Mental status is at baseline.   Psychiatric:         Mood and Affect: Mood normal.         Thought Content: Thought content normal.         Judgment: Judgment normal.          Labs Reviewed:  Chemistry:  Lab Results   Component Value Date     04/03/2023    K 4.1 04/03/2023    CHLORIDE 104 04/03/2023    BUN 16.0 04/03/2023    CREATININE 0.80 04/03/2023    EGFRNORACEVR 87 04/03/2023    GLUCOSE 91 04/03/2023    CALCIUM 9.2 04/03/2023    ALKPHOS 69 04/03/2023    LABPROT 6.4 04/03/2023    ALBUMIN 4.0 04/03/2023    AST 24 04/03/2023    ALT 16 04/03/2023    CEQTNTNB24HO 28.20 04/29/2022    TSH 1.61 02/03/2022    RXPIYG5XGZX 1.15 02/03/2022        Lab Results   Component Value Date    HGBA1C 5.6 10/22/2021        Hematology:  Lab Results   Component Value Date    WBC 4.4 10/22/2021    RBC 4.88 10/22/2021    HGB 13.8 10/22/2021    HCT 42.0 10/22/2021    MCV 86.1 10/22/2021    MCH 28.3 10/22/2021    MCHC 32.9 10/22/2021    RDW 12.8 10/22/2021     10/22/2021    MPV 10.2 10/22/2021       Lipid Panel:  Lab Results   Component Value Date    CHOL 171 04/03/2023    HDL 57 04/03/2023    DLDL 88.7 04/03/2023    TRIG 93 04/03/2023        Assessment & Plan:  1. Acute recurrent maxillary sinusitis  -     doxycycline (VIBRAMYCIN) 100 MG Cap; Take 1 capsule (100 mg total) by mouth every 12 (twelve) hours. for 10 days  Dispense: 20 capsule; Refill: 0    2. Moderate episode of recurrent major depressive disorder  Overview:  Stable On Wellbutrin  mg daily and venlafaxine 75 mg daily, 90day supply sent to be filled prior to moving to [place]    Orders:  -     venlafaxine (EFFEXOR-XR) 75 MG 24 hr capsule; Take 1 capsule (75 mg total) by mouth once daily.  Dispense: 90 capsule; Refill: 0  -     buPROPion (WELLBUTRIN XL) 150 MG TB24 tablet; Take 1 tablet (150 mg total) by mouth once daily.  Dispense: 90 tablet; Refill: 0    3. Anxiety  -     venlafaxine  (EFFEXOR-XR) 75 MG 24 hr capsule; Take 1 capsule (75 mg total) by mouth once daily.  Dispense: 90 capsule; Refill: 0  -     buPROPion (WELLBUTRIN XL) 150 MG TB24 tablet; Take 1 tablet (150 mg total) by mouth once daily.  Dispense: 90 tablet; Refill: 0    Other orders  -     mupirocin (BACTROBAN) 2 % ointment; Apply topically 3 (three) times daily.  Dispense: 22 g; Refill: 0         No future appointments.    Follow up if symptoms worsen or fail to improve. Call sooner if needed.    SHEILA BERNARD    Lab Frequency Next Occurrence   Comprehensive Metabolic Panel Once 06/30/2023   Lipid Panel Once 06/30/2023   Vitamin D Once 06/30/2023   Ambulatory referral/consult to Obstetrics / Gynecology Once 09/12/2023   CBC Auto Differential Once 10/03/2023   Comprehensive Metabolic Panel Once 10/03/2023   Lipid Panel Once 10/03/2023   Hemoglobin A1C Once 10/03/2023   TSH Once 10/03/2023   Vitamin D Once 10/03/2023   Vitamin B12 Once 10/03/2023   Mammo Digital Screening Bilat w/ Wenceslao Once 09/05/2023

## 2024-04-11 ENCOUNTER — TELEPHONE (OUTPATIENT)
Dept: FAMILY MEDICINE | Facility: CLINIC | Age: 59
End: 2024-04-11
Payer: MEDICAID

## 2024-04-11 ENCOUNTER — PATIENT MESSAGE (OUTPATIENT)
Dept: FAMILY MEDICINE | Facility: CLINIC | Age: 59
End: 2024-04-11
Payer: MEDICAID

## 2024-04-25 ENCOUNTER — TELEPHONE (OUTPATIENT)
Dept: FAMILY MEDICINE | Facility: CLINIC | Age: 59
End: 2024-04-25
Payer: MEDICAID

## 2024-04-25 DIAGNOSIS — F41.9 ANXIETY: ICD-10-CM

## 2024-04-25 DIAGNOSIS — F33.1 MODERATE EPISODE OF RECURRENT MAJOR DEPRESSIVE DISORDER: ICD-10-CM

## 2024-04-25 RX ORDER — BUPROPION HYDROCHLORIDE 150 MG/1
150 TABLET ORAL DAILY
Qty: 30 TABLET | Refills: 2 | Status: SHIPPED | OUTPATIENT
Start: 2024-04-25